# Patient Record
Sex: FEMALE | Race: WHITE | Employment: FULL TIME | URBAN - METROPOLITAN AREA
[De-identification: names, ages, dates, MRNs, and addresses within clinical notes are randomized per-mention and may not be internally consistent; named-entity substitution may affect disease eponyms.]

---

## 2017-01-07 ENCOUNTER — LAB ENCOUNTER (OUTPATIENT)
Dept: LAB | Age: 62
End: 2017-01-07
Attending: ORTHOPAEDIC SURGERY
Payer: COMMERCIAL

## 2017-01-07 DIAGNOSIS — Z01.818 PREOP TESTING: ICD-10-CM

## 2017-01-07 LAB
ANTIBODY SCREEN: NEGATIVE
RH BLOOD TYPE: POSITIVE

## 2017-01-07 PROCEDURE — 86900 BLOOD TYPING SEROLOGIC ABO: CPT

## 2017-01-07 PROCEDURE — 86901 BLOOD TYPING SEROLOGIC RH(D): CPT

## 2017-01-07 PROCEDURE — 86850 RBC ANTIBODY SCREEN: CPT

## 2017-01-07 PROCEDURE — 36415 COLL VENOUS BLD VENIPUNCTURE: CPT

## 2017-01-12 ENCOUNTER — ANESTHESIA (OUTPATIENT)
Dept: SURGERY | Facility: HOSPITAL | Age: 62
DRG: 462 | End: 2017-01-12
Payer: COMMERCIAL

## 2017-01-12 ENCOUNTER — SURGERY (OUTPATIENT)
Age: 62
End: 2017-01-12

## 2017-01-12 ENCOUNTER — ANESTHESIA EVENT (OUTPATIENT)
Dept: SURGERY | Facility: HOSPITAL | Age: 62
DRG: 462 | End: 2017-01-12
Payer: COMMERCIAL

## 2017-01-12 ENCOUNTER — APPOINTMENT (OUTPATIENT)
Dept: GENERAL RADIOLOGY | Facility: HOSPITAL | Age: 62
DRG: 462 | End: 2017-01-12
Attending: ORTHOPAEDIC SURGERY
Payer: COMMERCIAL

## 2017-01-12 PROCEDURE — 73560 X-RAY EXAM OF KNEE 1 OR 2: CPT

## 2017-01-12 RX ORDER — MIDAZOLAM HYDROCHLORIDE 1 MG/ML
INJECTION INTRAMUSCULAR; INTRAVENOUS AS NEEDED
Status: DISCONTINUED | OUTPATIENT
Start: 2017-01-12 | End: 2017-01-12 | Stop reason: SURG

## 2017-01-12 RX ORDER — BUPIVACAINE HYDROCHLORIDE 5 MG/ML
INJECTION, SOLUTION EPIDURAL; INTRACAUDAL AS NEEDED
Status: DISCONTINUED | OUTPATIENT
Start: 2017-01-12 | End: 2017-01-12 | Stop reason: SURG

## 2017-01-12 RX ORDER — DEXAMETHASONE SODIUM PHOSPHATE 4 MG/ML
VIAL (ML) INJECTION AS NEEDED
Status: DISCONTINUED | OUTPATIENT
Start: 2017-01-12 | End: 2017-01-12 | Stop reason: SURG

## 2017-01-12 RX ORDER — CEFAZOLIN SODIUM 1 G/3ML
INJECTION, POWDER, FOR SOLUTION INTRAMUSCULAR; INTRAVENOUS AS NEEDED
Status: DISCONTINUED | OUTPATIENT
Start: 2017-01-12 | End: 2017-01-12 | Stop reason: SURG

## 2017-01-12 RX ORDER — ONDANSETRON 2 MG/ML
INJECTION INTRAMUSCULAR; INTRAVENOUS AS NEEDED
Status: DISCONTINUED | OUTPATIENT
Start: 2017-01-12 | End: 2017-01-12 | Stop reason: SURG

## 2017-01-12 RX ORDER — LIDOCAINE HYDROCHLORIDE 10 MG/ML
INJECTION, SOLUTION EPIDURAL; INFILTRATION; INTRACAUDAL; PERINEURAL AS NEEDED
Status: DISCONTINUED | OUTPATIENT
Start: 2017-01-12 | End: 2017-01-12 | Stop reason: SURG

## 2017-01-12 RX ORDER — CLINDAMYCIN PHOSPHATE 150 MG/ML
INJECTION, SOLUTION INTRAVENOUS AS NEEDED
Status: DISCONTINUED | OUTPATIENT
Start: 2017-01-12 | End: 2017-01-12 | Stop reason: SURG

## 2017-01-12 RX ORDER — MORPHINE SULFATE 1 MG/ML
INJECTION, SOLUTION EPIDURAL; INTRATHECAL; INTRAVENOUS AS NEEDED
Status: DISCONTINUED | OUTPATIENT
Start: 2017-01-12 | End: 2017-01-12 | Stop reason: SURG

## 2017-01-12 RX ADMIN — CLINDAMYCIN PHOSPHATE 900 MG: 150 INJECTION, SOLUTION INTRAVENOUS at 16:18:00

## 2017-01-12 RX ADMIN — DEXAMETHASONE SODIUM PHOSPHATE 4 MG: 4 MG/ML VIAL (ML) INJECTION at 16:25:00

## 2017-01-12 RX ADMIN — MIDAZOLAM HYDROCHLORIDE 2.5 MG: 1 INJECTION INTRAMUSCULAR; INTRAVENOUS at 16:19:00

## 2017-01-12 RX ADMIN — SODIUM CHLORIDE, SODIUM LACTATE, POTASSIUM CHLORIDE, CALCIUM CHLORIDE: 600; 310; 30; 20 INJECTION, SOLUTION INTRAVENOUS at 16:03:00

## 2017-01-12 RX ADMIN — SODIUM CHLORIDE, SODIUM LACTATE, POTASSIUM CHLORIDE, CALCIUM CHLORIDE: 600; 310; 30; 20 INJECTION, SOLUTION INTRAVENOUS at 19:15:00

## 2017-01-12 RX ADMIN — LIDOCAINE HYDROCHLORIDE 20 MG: 10 INJECTION, SOLUTION EPIDURAL; INFILTRATION; INTRACAUDAL; PERINEURAL at 16:13:00

## 2017-01-12 RX ADMIN — BUPIVACAINE HYDROCHLORIDE 2.5 ML: 5 INJECTION, SOLUTION EPIDURAL; INTRACAUDAL at 16:15:00

## 2017-01-12 RX ADMIN — MIDAZOLAM HYDROCHLORIDE 2.5 MG: 1 INJECTION INTRAMUSCULAR; INTRAVENOUS at 16:07:00

## 2017-01-12 RX ADMIN — ONDANSETRON 4 MG: 2 INJECTION INTRAMUSCULAR; INTRAVENOUS at 16:25:00

## 2017-01-12 RX ADMIN — CEFAZOLIN SODIUM 1 G: 1 INJECTION, POWDER, FOR SOLUTION INTRAMUSCULAR; INTRAVENOUS at 17:46:00

## 2017-01-12 RX ADMIN — MIDAZOLAM HYDROCHLORIDE 2.5 MG: 1 INJECTION INTRAMUSCULAR; INTRAVENOUS at 17:32:00

## 2017-01-12 RX ADMIN — SODIUM CHLORIDE, SODIUM LACTATE, POTASSIUM CHLORIDE, CALCIUM CHLORIDE: 600; 310; 30; 20 INJECTION, SOLUTION INTRAVENOUS at 17:58:00

## 2017-01-12 RX ADMIN — MORPHINE SULFATE 0.3 MG: 1 INJECTION, SOLUTION EPIDURAL; INTRATHECAL; INTRAVENOUS at 16:15:00

## 2017-01-12 NOTE — ANESTHESIA PROCEDURE NOTES
Spinal Block  Performed by: SOLOMON MCCABE  Authorized by: SOLOMON MCCABE    Patient Location:  OR  Start Time:  1/12/2017 4:12 PM  End Time:  1/12/2017 4:15 PM  Site identification: surface landmarks    Reason for Block: surgical anesthesia    Anesthesi

## 2017-01-12 NOTE — ANESTHESIA PREPROCEDURE EVALUATION
Anesthesia PreOp Note    HPI:     Brandie Wetzel is a 65year old female who presents for preoperative consultation requested by: Viky Brown MD    Date of Surgery: 1/12/2017    Procedure(s):  KNEE TOTAL REPLACEMENT  Indication: Primary osteoarth Medications Ordered in Epic:  lactated ringers infusion  Intravenous Continuous Natalie Copeland MD Last Rate: 20 mL/hr at 01/12/17 1535   acetaminophen (TYLENOL) tab 650 mg 650 mg Oral Once Isaiah Copeland  mg at 01/12/17 1500   famoTIDine (P 145 12/17/2016   K 4.2 12/17/2016   * 12/17/2016   CO2 28 12/17/2016   BUN 13 12/17/2016   CREATSERUM 0.85 12/17/2016   GLU 98 12/17/2016          Vital Signs:   height is 1.6 m (5' 3\") and weight is 67.643 kg (149 lb 2 oz).  Her oral temperature is

## 2017-01-13 ENCOUNTER — APPOINTMENT (OUTPATIENT)
Dept: PHYSICAL THERAPY | Facility: HOSPITAL | Age: 62
DRG: 462 | End: 2017-01-13
Attending: PHYSICIAN ASSISTANT
Payer: COMMERCIAL

## 2017-01-13 NOTE — ANESTHESIA POSTPROCEDURE EVALUATION
Patient: Ravi Ng    Procedure Summary     Date Anesthesia Start Anesthesia Stop Room / Location    01/12/17 1605  [facility] OR 06 / [facility] MAIN OR       Procedure Diagnosis Surgeon Responsible Provider    KNEE TOTAL REPLACEMENT (Bilateral ) Primary os

## 2017-03-06 PROBLEM — Z96.653 S/P TOTAL KNEE REPLACEMENT, BILATERAL: Status: ACTIVE | Noted: 2017-03-06

## 2017-11-25 ENCOUNTER — HOSPITAL ENCOUNTER (OUTPATIENT)
Dept: MAMMOGRAPHY | Age: 62
Discharge: HOME | End: 2017-11-25
Attending: OBSTETRICS & GYNECOLOGY
Payer: COMMERCIAL

## 2017-11-25 DIAGNOSIS — Z12.31 ENCOUNTER FOR SCREENING MAMMOGRAM FOR MALIGNANT NEOPLASM OF BREAST: ICD-10-CM

## 2017-11-25 PROCEDURE — 77067 SCR MAMMO BI INCL CAD: CPT | Performed by: OBSTETRICS & GYNECOLOGY

## 2018-05-21 PROBLEM — F17.200 TOBACCO USE DISORDER: Status: ACTIVE | Noted: 2018-05-21

## 2018-06-04 PROBLEM — M65.4 DE QUERVAIN'S TENOSYNOVITIS, RIGHT: Status: ACTIVE | Noted: 2018-06-04

## 2018-07-27 PROCEDURE — 86762 RUBELLA ANTIBODY: CPT | Performed by: INTERNAL MEDICINE

## 2018-07-27 PROCEDURE — 86765 RUBEOLA ANTIBODY: CPT | Performed by: INTERNAL MEDICINE

## 2018-07-27 PROCEDURE — 86735 MUMPS ANTIBODY: CPT | Performed by: INTERNAL MEDICINE

## 2018-07-27 PROCEDURE — 36415 COLL VENOUS BLD VENIPUNCTURE: CPT | Performed by: INTERNAL MEDICINE

## 2018-12-10 ENCOUNTER — TELEPHONE (OUTPATIENT)
Dept: GASTROENTEROLOGY | Facility: CLINIC | Age: 63
End: 2018-12-10

## 2018-12-10 NOTE — TELEPHONE ENCOUNTER
----- Message from Trupti Dawson RN sent at 12/12/2016  7:53 AM CST -----  Regarding: Recall MRI  Recall MRI per GS for 2 years. Last procedure 12/2/16 (MRI/MRCP).

## 2018-12-11 NOTE — TELEPHONE ENCOUNTER
Please inform the patient that she is due for an MRI/MRCP of the pancreas. I have not, however, seen the patient since November 2015.   I would advise that she be seen in the office in follow-up first.  Alternatively she may proceed with follow-up MRI test

## 2018-12-12 NOTE — TELEPHONE ENCOUNTER
Future Appointments   Date Time Provider Virgen Medina   1/2/2019  4:15 PM Cynthia Moreau MD [facility] Deepthi LUCAS     Patient notified to come to [address] [facility] [address], [place], [place] (orange parking lot at [address] [facility], 3rd floor, arrive at 13

## 2019-01-02 ENCOUNTER — OFFICE VISIT (OUTPATIENT)
Dept: GASTROENTEROLOGY | Facility: CLINIC | Age: 64
End: 2019-01-02
Payer: COMMERCIAL

## 2019-01-02 VITALS
HEART RATE: 87 BPM | WEIGHT: 137 LBS | BODY MASS INDEX: 24.58 KG/M2 | HEIGHT: 62.5 IN | DIASTOLIC BLOOD PRESSURE: 75 MMHG | SYSTOLIC BLOOD PRESSURE: 127 MMHG

## 2019-01-02 DIAGNOSIS — D49.0 IPMN (INTRADUCTAL PAPILLARY MUCINOUS NEOPLASM): Primary | ICD-10-CM

## 2019-01-02 PROCEDURE — 99212 OFFICE O/P EST SF 10 MIN: CPT | Performed by: INTERNAL MEDICINE

## 2019-01-02 PROCEDURE — 99213 OFFICE O/P EST LOW 20 MIN: CPT | Performed by: INTERNAL MEDICINE

## 2019-01-03 ENCOUNTER — TELEPHONE (OUTPATIENT)
Dept: GASTROENTEROLOGY | Facility: CLINIC | Age: 64
End: 2019-01-03

## 2019-01-03 NOTE — TELEPHONE ENCOUNTER
Notified patient Dr. Andrey Moore has entered order for MRI/MRCP of pancreas and pt needs to call to schedule at [phone].     However, I notified patient to schedule at least 2 or more weeks from now so our managed care dept can work with patients insurance and

## 2019-01-03 NOTE — TELEPHONE ENCOUNTER
GI RN staff: I ordered an MRI/MRCP of the pancreas in light of a pancreatic cyst/IPMN. Please contact the patient once the study has been authorized so that she may schedule.

## 2019-01-03 NOTE — PROGRESS NOTES
HPI:    Patient ID: Janee Rabago is a 62year old female. HPI  The patient returns in follow-up. She was last seen in November 2015.     As per previous notes the patient has a history of an incidental well-demarcated non-septated cystic lesion of Mouth/Throat: No oropharyngeal exudate. Eyes: Conjunctivae are normal. No scleral icterus. Neck: Neck supple. No thyromegaly present. Cardiovascular: Normal rate, regular rhythm and normal heart sounds.    Pulmonary/Chest: Effort normal and breath s Albumin      3.5 - 4.8 g/dL 4.1   Total Bilirubin      0.10 - 2.00 mg/dL 0.24   ALKALINE PHOSPHATASE      50 - 130 U/L 114   AST (SGOT)      15 - 41 U/L 15   ALT (SGPT)      14 - 54 U/L 22   GFR CKD-EPI      >=60.00 mL/min/1.73 m² 74.73   HEMOGLOBIN A1C signal intensity. No focal mass or main pancreatic duct dilatation is seen. There is no evidence of pancreatitis. OTHER FINDINGS:          LUNG BASES:  Scattered reticular signal abnormalities are present and may reflect atelectasis or scarring.     FABIENNE stability for 5   years, a followup surveillance examination can be considered on clinical grounds. 2. Hepatomegaly or Riedel's lobe morphology. 3. Mild splenomegaly. 4. Lesser incidental findings as above.                  ASSESSMENT/PLAN:   Ip

## 2019-01-26 ENCOUNTER — HOSPITAL ENCOUNTER (OUTPATIENT)
Dept: MRI IMAGING | Facility: HOSPITAL | Age: 64
Discharge: HOME | End: 2019-01-26
Attending: INTERNAL MEDICINE
Payer: COMMERCIAL

## 2019-01-26 DIAGNOSIS — D49.0 IPMN (INTRADUCTAL PAPILLARY MUCINOUS NEOPLASM): ICD-10-CM

## 2019-01-26 PROCEDURE — 74183 MRI ABD W/O CNTR FLWD CNTR: CPT | Performed by: INTERNAL MEDICINE

## 2019-01-26 PROCEDURE — A9575 INJ GADOTERATE MEGLUMI 0.1ML: HCPCS | Performed by: INTERNAL MEDICINE

## 2019-01-31 ENCOUNTER — TELEPHONE (OUTPATIENT)
Dept: GASTROENTEROLOGY | Facility: CLINIC | Age: 64
End: 2019-01-31

## 2019-01-31 NOTE — TELEPHONE ENCOUNTER
Future Appointments   Date Time Provider Virgen Medina   3/11/2019  3:15 PM Kay Moreau MD [facility]     Appt with .

## 2019-01-31 NOTE — TELEPHONE ENCOUNTER
MRI/MRCP recall for 2 years entered for 1/2021.     LMTCB- patient to have f/u appt with Dr. Jana Kovacs around 2/28/19

## 2019-01-31 NOTE — TELEPHONE ENCOUNTER
----- Message from Bassam Rosenthal MD sent at 1/29/2019  6:28 PM CST -----  I spoke to the patient. The MRI reveals that the pancreatic cystic lesion is stable in size and appearance.   The patient has been experiencing intermittent nausea, \"pudding-

## 2019-03-11 ENCOUNTER — LAB ENCOUNTER (OUTPATIENT)
Dept: LAB | Facility: HOSPITAL | Age: 64
End: 2019-03-11
Attending: INTERNAL MEDICINE
Payer: COMMERCIAL

## 2019-03-11 ENCOUNTER — OFFICE VISIT (OUTPATIENT)
Dept: GASTROENTEROLOGY | Facility: CLINIC | Age: 64
End: 2019-03-11
Payer: COMMERCIAL

## 2019-03-11 VITALS
HEART RATE: 86 BPM | WEIGHT: 135 LBS | HEIGHT: 62.5 IN | SYSTOLIC BLOOD PRESSURE: 132 MMHG | DIASTOLIC BLOOD PRESSURE: 79 MMHG | BODY MASS INDEX: 24.22 KG/M2

## 2019-03-11 DIAGNOSIS — R19.4 CHANGE IN BOWEL HABITS: ICD-10-CM

## 2019-03-11 DIAGNOSIS — R19.4 CHANGE IN BOWEL HABITS: Primary | ICD-10-CM

## 2019-03-11 DIAGNOSIS — R63.4 WEIGHT LOSS: ICD-10-CM

## 2019-03-11 LAB
ALBUMIN SERPL-MCNC: 3.9 G/DL (ref 3.4–5)
ALBUMIN/GLOB SERPL: 1.1 {RATIO} (ref 1–2)
ALP LIVER SERPL-CCNC: 89 U/L (ref 50–130)
ALT SERPL-CCNC: 25 U/L (ref 13–56)
ANION GAP SERPL CALC-SCNC: 5 MMOL/L (ref 0–18)
AST SERPL-CCNC: 15 U/L (ref 15–37)
BASOPHILS # BLD AUTO: 0.07 X10(3) UL (ref 0–0.2)
BASOPHILS NFR BLD AUTO: 0.9 %
BILIRUB SERPL-MCNC: 0.4 MG/DL (ref 0.1–2)
BUN BLD-MCNC: 23 MG/DL (ref 7–18)
BUN/CREAT SERPL: 25 (ref 10–20)
CALCIUM BLD-MCNC: 9 MG/DL (ref 8.5–10.1)
CHLORIDE SERPL-SCNC: 108 MMOL/L (ref 98–107)
CO2 SERPL-SCNC: 27 MMOL/L (ref 21–32)
CREAT BLD-MCNC: 0.92 MG/DL (ref 0.55–1.02)
DEPRECATED RDW RBC AUTO: 47 FL (ref 35.1–46.3)
EOSINOPHIL # BLD AUTO: 0.16 X10(3) UL (ref 0–0.7)
EOSINOPHIL NFR BLD AUTO: 2.1 %
ERYTHROCYTE [DISTWIDTH] IN BLOOD BY AUTOMATED COUNT: 13.6 % (ref 11–15)
ERYTHROCYTE [SEDIMENTATION RATE] IN BLOOD: 7 MM/HR (ref 0–30)
GLOBULIN PLAS-MCNC: 3.4 G/DL (ref 2.8–4.4)
GLUCOSE BLD-MCNC: 91 MG/DL (ref 70–99)
HCT VFR BLD AUTO: 44.1 % (ref 35–48)
HGB BLD-MCNC: 14.2 G/DL (ref 12–16)
IGA SERPL-MCNC: 135 MG/DL (ref 70–312)
IMM GRANULOCYTES # BLD AUTO: 0.02 X10(3) UL (ref 0–1)
IMM GRANULOCYTES NFR BLD: 0.3 %
LYMPHOCYTES # BLD AUTO: 2.18 X10(3) UL (ref 1–4)
LYMPHOCYTES NFR BLD AUTO: 29.2 %
M PROTEIN MFR SERPL ELPH: 7.3 G/DL (ref 6.4–8.2)
MCH RBC QN AUTO: 30.5 PG (ref 26–34)
MCHC RBC AUTO-ENTMCNC: 32.2 G/DL (ref 31–37)
MCV RBC AUTO: 94.8 FL (ref 80–100)
MONOCYTES # BLD AUTO: 0.49 X10(3) UL (ref 0.1–1)
MONOCYTES NFR BLD AUTO: 6.6 %
NEUTROPHILS # BLD AUTO: 4.54 X10 (3) UL (ref 1.5–7.7)
NEUTROPHILS # BLD AUTO: 4.54 X10(3) UL (ref 1.5–7.7)
NEUTROPHILS NFR BLD AUTO: 60.9 %
OSMOLALITY SERPL CALC.SUM OF ELEC: 293 MOSM/KG (ref 275–295)
PLATELET # BLD AUTO: 236 10(3)UL (ref 150–450)
POTASSIUM SERPL-SCNC: 4.2 MMOL/L (ref 3.5–5.1)
RBC # BLD AUTO: 4.65 X10(6)UL (ref 3.8–5.3)
SODIUM SERPL-SCNC: 140 MMOL/L (ref 136–145)
TSI SER-ACNC: 1.21 MIU/ML (ref 0.36–3.74)
WBC # BLD AUTO: 7.5 X10(3) UL (ref 4–11)

## 2019-03-11 PROCEDURE — 99212 OFFICE O/P EST SF 10 MIN: CPT | Performed by: INTERNAL MEDICINE

## 2019-03-11 PROCEDURE — 83516 IMMUNOASSAY NONANTIBODY: CPT

## 2019-03-11 PROCEDURE — 80053 COMPREHEN METABOLIC PANEL: CPT

## 2019-03-11 PROCEDURE — 99213 OFFICE O/P EST LOW 20 MIN: CPT | Performed by: INTERNAL MEDICINE

## 2019-03-11 PROCEDURE — 36415 COLL VENOUS BLD VENIPUNCTURE: CPT

## 2019-03-11 PROCEDURE — 84443 ASSAY THYROID STIM HORMONE: CPT

## 2019-03-11 PROCEDURE — 85652 RBC SED RATE AUTOMATED: CPT

## 2019-03-11 PROCEDURE — 85025 COMPLETE CBC W/AUTO DIFF WBC: CPT

## 2019-03-11 PROCEDURE — 82784 ASSAY IGA/IGD/IGG/IGM EACH: CPT

## 2019-03-11 NOTE — PROGRESS NOTES
HPI:    Patient ID: Janee Rabago is a 62year old female. HPI  The patient returns in follow-up. She was last seen in January 2019.     As per previous notes the patient has a history of an incidental well-demarcated non-septated cystic lesion of th November 2020).       Review of Systems   See above      Wt Readings from Last 6 Encounters:  03/11/19 : 135 lb (61.2 kg)  01/02/19 : 137 lb (62.1 kg)  07/27/18 : 145 lb (65.8 kg)  06/07/18 : 149 lb (67.6 kg)  06/04/18 : 145 lb (65.8 kg)  05/17/18 : 149 lb however, in light of the symptoms and weight loss, I would recommend further evaluation. I am recommending laboratory testing including a CBC, chemistries, TSH, ESR and sprue serology.   The patient is a smoker and I have asked her to discuss lung cancer s

## 2019-03-11 NOTE — PATIENT INSTRUCTIONS
1.  Obtain blood work for celiac disease. 2.  Please speak to Dr. Zohaib Whatley about additional testing such as chest imaging.

## 2019-03-13 LAB — TTG IGA SER-ACNC: 0.2 U/ML (ref ?–7)

## 2020-09-17 ENCOUNTER — HOSPITAL ENCOUNTER (EMERGENCY)
Facility: HOSPITAL | Age: 65
Discharge: HOME | End: 2020-09-17
Attending: EMERGENCY MEDICINE
Payer: COMMERCIAL

## 2020-09-17 ENCOUNTER — APPOINTMENT (OUTPATIENT)
Dept: GENERAL RADIOLOGY | Facility: HOSPITAL | Age: 65
End: 2020-09-17
Attending: EMERGENCY MEDICINE
Payer: COMMERCIAL

## 2020-09-17 VITALS
DIASTOLIC BLOOD PRESSURE: 73 MMHG | OXYGEN SATURATION: 97 % | BODY MASS INDEX: 24.8 KG/M2 | HEART RATE: 80 BPM | TEMPERATURE: 99 F | RESPIRATION RATE: 21 BRPM | WEIGHT: 140 LBS | SYSTOLIC BLOOD PRESSURE: 108 MMHG | HEIGHT: 63 IN

## 2020-09-17 DIAGNOSIS — R07.89 CHEST PAIN, ATYPICAL: Primary | ICD-10-CM

## 2020-09-17 LAB
ALBUMIN SERPL-MCNC: 3.9 G/DL (ref 3.4–5)
ALP LIVER SERPL-CCNC: 98 U/L (ref 50–130)
ALT SERPL-CCNC: 23 U/L (ref 13–56)
ANION GAP SERPL CALC-SCNC: 5 MMOL/L (ref 0–18)
AST SERPL-CCNC: 13 U/L (ref 15–37)
BASOPHILS # BLD AUTO: 0.05 X10(3) UL (ref 0–0.2)
BASOPHILS NFR BLD AUTO: 0.7 %
BILIRUB DIRECT SERPL-MCNC: 0.2 MG/DL (ref 0–0.2)
BILIRUB SERPL-MCNC: 0.7 MG/DL (ref 0.1–2)
BUN BLD-MCNC: 11 MG/DL (ref 7–18)
BUN/CREAT SERPL: 13.6 (ref 10–20)
CALCIUM BLD-MCNC: 9.3 MG/DL (ref 8.5–10.1)
CHLORIDE SERPL-SCNC: 111 MMOL/L (ref 98–112)
CO2 SERPL-SCNC: 25 MMOL/L (ref 21–32)
CREAT BLD-MCNC: 0.81 MG/DL (ref 0.55–1.02)
D DIMER PPP FEU-MCNC: 0.33 UG/ML FEU (ref ?–0.64)
DEPRECATED RDW RBC AUTO: 43.8 FL (ref 35.1–46.3)
EOSINOPHIL # BLD AUTO: 0.02 X10(3) UL (ref 0–0.7)
EOSINOPHIL NFR BLD AUTO: 0.3 %
ERYTHROCYTE [DISTWIDTH] IN BLOOD BY AUTOMATED COUNT: 12.8 % (ref 11–15)
GLUCOSE BLD-MCNC: 122 MG/DL (ref 70–99)
HCT VFR BLD AUTO: 43.3 % (ref 35–48)
HGB BLD-MCNC: 14.3 G/DL (ref 12–16)
IMM GRANULOCYTES # BLD AUTO: 0.02 X10(3) UL (ref 0–1)
IMM GRANULOCYTES NFR BLD: 0.3 %
LIPASE SERPL-CCNC: 97 U/L (ref 73–393)
LYMPHOCYTES # BLD AUTO: 1.26 X10(3) UL (ref 1–4)
LYMPHOCYTES NFR BLD AUTO: 18.6 %
M PROTEIN MFR SERPL ELPH: 7.6 G/DL (ref 6.4–8.2)
MCH RBC QN AUTO: 30.8 PG (ref 26–34)
MCHC RBC AUTO-ENTMCNC: 33 G/DL (ref 31–37)
MCV RBC AUTO: 93.1 FL (ref 80–100)
MONOCYTES # BLD AUTO: 0.39 X10(3) UL (ref 0.1–1)
MONOCYTES NFR BLD AUTO: 5.8 %
NEUTROPHILS # BLD AUTO: 5.02 X10 (3) UL (ref 1.5–7.7)
NEUTROPHILS # BLD AUTO: 5.02 X10(3) UL (ref 1.5–7.7)
NEUTROPHILS NFR BLD AUTO: 74.3 %
OSMOLALITY SERPL CALC.SUM OF ELEC: 293 MOSM/KG (ref 275–295)
PLATELET # BLD AUTO: 212 10(3)UL (ref 150–450)
POTASSIUM SERPL-SCNC: 3.7 MMOL/L (ref 3.5–5.1)
RBC # BLD AUTO: 4.65 X10(6)UL (ref 3.8–5.3)
SODIUM SERPL-SCNC: 141 MMOL/L (ref 136–145)
TROPONIN I SERPL-MCNC: <0.045 NG/ML (ref ?–0.04)
TROPONIN I SERPL-MCNC: <0.045 NG/ML (ref ?–0.04)
WBC # BLD AUTO: 6.8 X10(3) UL (ref 4–11)

## 2020-09-17 PROCEDURE — 96374 THER/PROPH/DIAG INJ IV PUSH: CPT

## 2020-09-17 PROCEDURE — 93005 ELECTROCARDIOGRAM TRACING: CPT

## 2020-09-17 PROCEDURE — 93010 ELECTROCARDIOGRAM REPORT: CPT | Performed by: EMERGENCY MEDICINE

## 2020-09-17 PROCEDURE — 71045 X-RAY EXAM CHEST 1 VIEW: CPT | Performed by: EMERGENCY MEDICINE

## 2020-09-17 PROCEDURE — 85379 FIBRIN DEGRADATION QUANT: CPT | Performed by: EMERGENCY MEDICINE

## 2020-09-17 PROCEDURE — 80048 BASIC METABOLIC PNL TOTAL CA: CPT | Performed by: EMERGENCY MEDICINE

## 2020-09-17 PROCEDURE — 83690 ASSAY OF LIPASE: CPT | Performed by: EMERGENCY MEDICINE

## 2020-09-17 PROCEDURE — 84484 ASSAY OF TROPONIN QUANT: CPT | Performed by: EMERGENCY MEDICINE

## 2020-09-17 PROCEDURE — 80076 HEPATIC FUNCTION PANEL: CPT | Performed by: EMERGENCY MEDICINE

## 2020-09-17 PROCEDURE — 99284 EMERGENCY DEPT VISIT MOD MDM: CPT

## 2020-09-17 PROCEDURE — 85025 COMPLETE CBC W/AUTO DIFF WBC: CPT | Performed by: EMERGENCY MEDICINE

## 2020-09-17 RX ORDER — ONDANSETRON 2 MG/ML
4 INJECTION INTRAMUSCULAR; INTRAVENOUS ONCE
Status: COMPLETED | OUTPATIENT
Start: 2020-09-17 | End: 2020-09-17

## 2020-09-17 RX ORDER — ASPIRIN 81 MG/1
324 TABLET, CHEWABLE ORAL ONCE
Status: COMPLETED | OUTPATIENT
Start: 2020-09-17 | End: 2020-09-17

## 2020-09-17 RX ORDER — IPRATROPIUM BROMIDE AND ALBUTEROL SULFATE 2.5; .5 MG/3ML; MG/3ML
SOLUTION RESPIRATORY (INHALATION)
Status: DISCONTINUED
Start: 2020-09-17 | End: 2020-09-17

## 2020-09-17 NOTE — ED INITIAL ASSESSMENT (HPI)
Pt presents to ED for c/o intermittent episodes of chest \"tightness\" starting yesterday afternoon after receiving flu shot at school . Pt states she was driving home and experienced tightness which resolved after several minutes.  Pt states she experience

## 2020-09-17 NOTE — ED PROVIDER NOTES
Patient Seen in: [facility] AND [facility] Emergency Department      History   Patient presents with:  Chest Pain Angina    Stated Complaint: chest pain    HPI    44-year-old female with 2 episodes now 1 yesterday and 1 overnight of some mild nausea and discomfo (Temporal)   Resp (!) 27   Ht 160 cm (5' 3\")   Wt 63.5 kg   SpO2 95%   BMI 24.80 kg/m²         Physical Exam    Constitutional: Oriented to person, place, and time. Appears well-developed. No distress. Head: Normocephalic and atraumatic.    Eyes: Conjun intervals and axes as noted on EKG Report.   Rate: 100  Rhythm: Sinus Rhythm  Reading: No acute ischemic findings, nonspecific ST segment changes, no prior for comparison    Repeat to EKG at 1129 shows sinus rate of 83 with no acute changes and no significa

## 2020-10-19 NOTE — PROGRESS NOTES
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 • Cholecalciferol (VITAMIN D) 2000 UNITS Oral Tab Take  by mouth.          Allergies:    Amoxicillin-Na Dennis*    DIARRHEA    Comment:Blood in the stool    ROS:   CONSTITUTIONAL:  negative for fevers, chills  EYES:  negative for change in kidney stones, vaginal prolapse, who presents for colonoscopy recall evaluation        1. History of colon polyps: Patient is due for a 5-year colonoscopy recall due to a prior history of colon polyps on her procedure in 2015.   She is asymptomatic from an appropriate diagnostic information/codes. All questions were answered to the patient’s satisfaction. The patient signed informed consent and elected to proceed with colonoscopy with intervention [i.e. polypectomy, stent placement, etc.] as indicated.

## 2020-10-26 PROBLEM — I25.10 CORONARY ARTERY CALCIFICATION OF NATIVE ARTERY: Status: ACTIVE | Noted: 2020-10-26

## 2020-10-26 PROBLEM — I25.84 CORONARY ARTERY CALCIFICATION OF NATIVE ARTERY: Status: ACTIVE | Noted: 2020-10-26

## 2020-10-26 PROBLEM — E78.00 HYPERCHOLESTEROLEMIA: Status: ACTIVE | Noted: 2020-10-26

## 2020-11-02 ENCOUNTER — TELEPHONE (OUTPATIENT)
Dept: GASTROENTEROLOGY | Facility: CLINIC | Age: 65
End: 2020-11-02

## 2020-11-02 ENCOUNTER — OFFICE VISIT (OUTPATIENT)
Dept: GASTROENTEROLOGY | Facility: CLINIC | Age: 65
End: 2020-11-02
Payer: COMMERCIAL

## 2020-11-02 VITALS
TEMPERATURE: 98 F | SYSTOLIC BLOOD PRESSURE: 120 MMHG | WEIGHT: 144 LBS | HEIGHT: 62 IN | BODY MASS INDEX: 26.5 KG/M2 | HEART RATE: 96 BPM | DIASTOLIC BLOOD PRESSURE: 77 MMHG

## 2020-11-02 DIAGNOSIS — K86.2 PANCREATIC CYST: ICD-10-CM

## 2020-11-02 DIAGNOSIS — Z86.010 HISTORY OF COLON POLYPS: Primary | ICD-10-CM

## 2020-11-02 DIAGNOSIS — Z86.010 HX OF COLONIC POLYPS: Primary | ICD-10-CM

## 2020-11-02 PROCEDURE — 3008F BODY MASS INDEX DOCD: CPT | Performed by: NURSE PRACTITIONER

## 2020-11-02 PROCEDURE — 3074F SYST BP LT 130 MM HG: CPT | Performed by: NURSE PRACTITIONER

## 2020-11-02 PROCEDURE — 3078F DIAST BP <80 MM HG: CPT | Performed by: NURSE PRACTITIONER

## 2020-11-02 PROCEDURE — 99214 OFFICE O/P EST MOD 30 MIN: CPT | Performed by: NURSE PRACTITIONER

## 2020-11-02 RX ORDER — POLYETHYLENE GLYCOL 3350, SODIUM CHLORIDE, SODIUM BICARBONATE, POTASSIUM CHLORIDE 420; 11.2; 5.72; 1.48 G/4L; G/4L; G/4L; G/4L
POWDER, FOR SOLUTION ORAL
Qty: 1 BOTTLE | Refills: 0 | Status: ON HOLD | OUTPATIENT
Start: 2020-11-02 | End: 2021-05-04

## 2020-11-02 NOTE — PATIENT INSTRUCTIONS
-Schedule colonoscopy w/ Dr. Yaquelin Oro with IV twilight or MAC  Dx: hx colon polyps  -Eligible for NE: Yes  -Prep: Split dose Colyte/TriLyte or equivalent  -Anti-platelets and anti-coagulants: ASA-continue as prescribed  -Diabetes meds: None    ** If MA

## 2020-11-02 NOTE — TELEPHONE ENCOUNTER
Scheduled for: Colonoscopy 75441   Provider Name: Dr Betancourt Ear   Date:  Mon 2/15/2021   Location: [facility]   Sedation: IV   Time: 10:45 am  Prep: split dose colyte   Meds/Allergies Reconciled?:  Reviewed by provider  Diagnosis with codes: HCP Z86.010   Was pa

## 2020-12-16 ENCOUNTER — HOSPITAL ENCOUNTER (OUTPATIENT)
Dept: MRI IMAGING | Facility: HOSPITAL | Age: 65
Discharge: HOME | End: 2020-12-16
Attending: NURSE PRACTITIONER
Payer: COMMERCIAL

## 2020-12-16 DIAGNOSIS — K86.2 PANCREATIC CYST: ICD-10-CM

## 2020-12-16 PROCEDURE — 74181 MRI ABDOMEN W/O CONTRAST: CPT | Performed by: NURSE PRACTITIONER

## 2020-12-16 PROCEDURE — 76376 3D RENDER W/INTRP POSTPROCES: CPT | Performed by: NURSE PRACTITIONER

## 2020-12-17 ENCOUNTER — TELEPHONE (OUTPATIENT)
Dept: GASTROENTEROLOGY | Facility: CLINIC | Age: 65
End: 2020-12-17

## 2020-12-17 NOTE — TELEPHONE ENCOUNTER
Debora Faye, GELY  [facility] Clinical Staff             Reviewed MRCP with patient via [employer].  Redemonstrates stable pancreatic cyst.  Repeat in 2 yrs.        Nursing: Please place a 2-year recall for a repeat MRI abdomen/MRCP related to pancreatic lesi

## 2021-02-02 NOTE — TELEPHONE ENCOUNTER
I called the patient, who states her insurance has changed, effected 01/0/12021. Transferred to the  to update insurance information in [facility]

## 2021-02-03 ENCOUNTER — TELEPHONE (OUTPATIENT)
Dept: GASTROENTEROLOGY | Facility: CLINIC | Age: 66
End: 2021-02-03

## 2021-02-03 DIAGNOSIS — Z86.010 HISTORY OF COLON POLYPS: Primary | ICD-10-CM

## 2021-02-03 NOTE — TELEPHONE ENCOUNTER
Pt calling to reschedule CLN 2/15/21 due to insurance. Pt states she only has medicare part A and not part B.  Pt states she is in the process of applying for medicare part B.

## 2021-02-03 NOTE — TELEPHONE ENCOUNTER
Rescheduled for:  Colonoscopy 43958  Provider Name:  Dr. Jimmy Burris  Date:    From-2/15/21  To-5/4/21  Location:    [facility]  Sedation:   From-IV  To-MAC  Time:    From-1045  To-1015  Prep:  Colyte, sent new instructions   Meds/Allergies Reconciled?:  Physici

## 2021-03-04 DIAGNOSIS — Z23 NEED FOR VACCINATION: ICD-10-CM

## 2021-03-10 ENCOUNTER — IMMUNIZATION (OUTPATIENT)
Dept: LAB | Facility: HOSPITAL | Age: 66
End: 2021-03-10
Attending: HOSPITALIST
Payer: OTHER GOVERNMENT

## 2021-03-10 DIAGNOSIS — Z23 NEED FOR VACCINATION: Primary | ICD-10-CM

## 2021-03-10 PROCEDURE — 0011A SARSCOV2 VAC 100MCG/0.5ML IM: CPT

## 2021-04-07 ENCOUNTER — IMMUNIZATION (OUTPATIENT)
Dept: LAB | Facility: HOSPITAL | Age: 66
End: 2021-04-07
Attending: EMERGENCY MEDICINE
Payer: MEDICARE

## 2021-04-07 DIAGNOSIS — Z23 NEED FOR VACCINATION: Primary | ICD-10-CM

## 2021-04-07 PROCEDURE — 0012A SARSCOV2 VAC 100MCG/0.5ML IM: CPT

## 2021-04-19 NOTE — TELEPHONE ENCOUNTER
Phone room:    Can you please reach out to the patient to obtain updated insurance information. Thank you!

## 2021-04-28 RX ORDER — MULTIVIT WITH MINERALS/LUTEIN
1000 TABLET ORAL DAILY
COMMUNITY

## 2021-05-01 ENCOUNTER — LAB ENCOUNTER (OUTPATIENT)
Dept: LAB | Age: 66
End: 2021-05-01
Attending: INTERNAL MEDICINE
Payer: MEDICARE

## 2021-05-01 DIAGNOSIS — Z01.818 PRE-OP TESTING: ICD-10-CM

## 2021-05-04 ENCOUNTER — HOSPITAL ENCOUNTER (OUTPATIENT)
Facility: HOSPITAL | Age: 66
Setting detail: HOSPITAL OUTPATIENT SURGERY
Discharge: HOME | End: 2021-05-04
Attending: INTERNAL MEDICINE | Admitting: INTERNAL MEDICINE
Payer: MEDICARE

## 2021-05-04 ENCOUNTER — ANESTHESIA EVENT (OUTPATIENT)
Dept: ENDOSCOPY | Facility: HOSPITAL | Age: 66
End: 2021-05-04
Payer: MEDICARE

## 2021-05-04 ENCOUNTER — ANESTHESIA (OUTPATIENT)
Dept: ENDOSCOPY | Facility: HOSPITAL | Age: 66
End: 2021-05-04
Payer: MEDICARE

## 2021-05-04 VITALS
TEMPERATURE: 97 F | OXYGEN SATURATION: 95 % | WEIGHT: 140 LBS | HEART RATE: 86 BPM | HEIGHT: 63 IN | RESPIRATION RATE: 18 BRPM | SYSTOLIC BLOOD PRESSURE: 122 MMHG | BODY MASS INDEX: 24.8 KG/M2 | DIASTOLIC BLOOD PRESSURE: 74 MMHG

## 2021-05-04 DIAGNOSIS — Z01.818 PRE-OP TESTING: Primary | ICD-10-CM

## 2021-05-04 DIAGNOSIS — Z86.010 HX OF COLONIC POLYPS: ICD-10-CM

## 2021-05-04 PROCEDURE — 45385 COLONOSCOPY W/LESION REMOVAL: CPT | Performed by: INTERNAL MEDICINE

## 2021-05-04 PROCEDURE — 0DBK8ZX EXCISION OF ASCENDING COLON, VIA NATURAL OR ARTIFICIAL OPENING ENDOSCOPIC, DIAGNOSTIC: ICD-10-PCS | Performed by: INTERNAL MEDICINE

## 2021-05-04 RX ORDER — SODIUM CHLORIDE, SODIUM LACTATE, POTASSIUM CHLORIDE, CALCIUM CHLORIDE 600; 310; 30; 20 MG/100ML; MG/100ML; MG/100ML; MG/100ML
INJECTION, SOLUTION INTRAVENOUS CONTINUOUS
Status: DISCONTINUED | OUTPATIENT
Start: 2021-05-04 | End: 2021-05-04

## 2021-05-04 RX ORDER — NALOXONE HYDROCHLORIDE 0.4 MG/ML
80 INJECTION, SOLUTION INTRAMUSCULAR; INTRAVENOUS; SUBCUTANEOUS AS NEEDED
Status: DISCONTINUED | OUTPATIENT
Start: 2021-05-04 | End: 2021-05-04

## 2021-05-04 RX ORDER — LIDOCAINE HYDROCHLORIDE 10 MG/ML
INJECTION, SOLUTION EPIDURAL; INFILTRATION; INTRACAUDAL; PERINEURAL AS NEEDED
Status: DISCONTINUED | OUTPATIENT
Start: 2021-05-04 | End: 2021-05-04 | Stop reason: SURG

## 2021-05-04 RX ADMIN — LIDOCAINE HYDROCHLORIDE 50 MG: 10 INJECTION, SOLUTION EPIDURAL; INFILTRATION; INTRACAUDAL; PERINEURAL at 10:56:00

## 2021-05-04 RX ADMIN — SODIUM CHLORIDE, SODIUM LACTATE, POTASSIUM CHLORIDE, CALCIUM CHLORIDE: 600; 310; 30; 20 INJECTION, SOLUTION INTRAVENOUS at 11:23:00

## 2021-05-04 NOTE — OPERATIVE REPORT
[facility] Endoscopy Report      Date of Procedure:  05/04/21      Preoperative Diagnosis:  1. Colorectal cancer screening  2. Family history of colon cancer  3.   Personal history of adenomatous colon polyps      Postoperative Diagnosis: abnormalities. The procedure was well tolerated without immediate complication. Impression:  1. Colon polyp  2. Sigmoid colon diverticulosis, currently uncomplicated  3. Otherwise normal colonoscopy to the terminal ileum    Recommendations:  1.   H

## 2021-05-04 NOTE — ANESTHESIA PREPROCEDURE EVALUATION
Anesthesia PreOp Note    HPI:     Mariola Whelan is a 73year old female who presents for preoperative consultation requested by: Bianca Corey MD    Date of Surgery: 5/4/2021    Procedure(s):  COLONOSCOPY  Indication: Hx of colonic polyps    Rel Cholecalciferol (VITAMIN D) 2000 UNITS Oral Tab, Take  by mouth., Disp: , Rfl:   PEG 3350-KCl-Na Bicarb-NaCl (TRILYTE) 420 g Oral Recon Soln, Take prep as directed by gastro office.  May substitute with Trilyte/generic equivalent if needed, Disp: 1 Bottle, Seat Belt: Not Asked        Self-Exams: Not Asked    Social History Narrative      Not on file    Social Determinants of Health  Financial Resource Strain:       Difficulty of Paying Living Expenses:   Food Insecurity:       Worried About Running Out of Fo normal exam             Anesthesia Plan:   ASA:  2  Plan:   MAC  Informed Consent Plan and Risks Discussed With:  Patient  Discussed plan with:  CRNA and surgeon      I have informed Thao Seamanry and/or legal guardian or family member of the nature of

## 2021-05-04 NOTE — H&P
History & Physical Examination    Patient Name: Cammie Brito  MRN: [id number]  CSN: [id number]  YOB: 1955    Diagnosis: Colorectal cancer screening, family history of colon cancer, personal history of adenomatous polyps      Ascorbic Acid not sure onset     Social History    Tobacco Use      Smoking status: Current Every Day Smoker        Packs/day: 0.50        Types: Cigarettes      Smokeless tobacco: Never Used      Tobacco comment: Pt reports having \"tapered down\" recently    A

## 2021-05-06 ENCOUNTER — TELEPHONE (OUTPATIENT)
Dept: GASTROENTEROLOGY | Facility: CLINIC | Age: 66
End: 2021-05-06

## 2021-05-06 NOTE — TELEPHONE ENCOUNTER
Health Maintenance Updated. 5 year colonoscopy recall entered into patient outreach in [address] [place] [address] Rd. Next colonoscopy will be due 5/4/2026.

## 2021-05-06 NOTE — TELEPHONE ENCOUNTER
----- Message from Ruby Pool MD sent at 5/6/2021  5:32 PM CDT -----  As per LORENZO parameters left a message on the patient's voicemail. The ascending colon polyp was not adenomatous. I have recommended a surveillance colonoscopy in 5 years.   I h

## 2021-11-08 PROBLEM — M51.369 DEGENERATIVE DISC DISEASE, LUMBAR: Status: ACTIVE | Noted: 2021-11-08

## 2021-11-08 PROBLEM — M65.4 DE QUERVAIN'S TENOSYNOVITIS, RIGHT: Status: RESOLVED | Noted: 2018-06-04 | Resolved: 2021-11-08

## 2021-11-08 PROBLEM — M51.36 DEGENERATIVE DISC DISEASE, LUMBAR: Status: ACTIVE | Noted: 2021-11-08

## 2021-11-10 PROBLEM — Z87.442 HISTORY OF KIDNEY STONES: Status: ACTIVE | Noted: 2021-11-10

## 2021-11-10 PROBLEM — Z78.0 POSTMENOPAUSAL: Status: ACTIVE | Noted: 2021-11-10

## 2022-11-25 ENCOUNTER — TELEPHONE (OUTPATIENT)
Facility: CLINIC | Age: 67
End: 2022-11-25

## 2022-11-25 DIAGNOSIS — D49.0 IPMN (INTRADUCTAL PAPILLARY MUCINOUS NEOPLASM): Primary | ICD-10-CM

## 2022-11-25 NOTE — TELEPHONE ENCOUNTER
Patient outreach message received:    2 year MRI/MRCP recall entered in patient outreach per Jeramie Pearson.  Due on 12/17/2022

## 2022-11-25 NOTE — TELEPHONE ENCOUNTER
Dr. Baljinder Martin recommended recall MRI/MRCP in 2 years related to pancreatic lesion. Please place order if plan still appropriate and RN can notify the patient.     Thank you

## 2022-11-28 NOTE — TELEPHONE ENCOUNTER
I have entered the order for the MRI/MRCP. The patient should also be seen in the office in follow-up at her convenience.

## 2022-11-29 NOTE — TELEPHONE ENCOUNTER
Patient contacted. Aware she is due for MRI/MRCP  All questions answered. She wrote down the number to central scheduling and will call to schedule when she gets home from work.

## 2022-12-28 ENCOUNTER — HOSPITAL ENCOUNTER (OUTPATIENT)
Dept: MRI IMAGING | Facility: HOSPITAL | Age: 67
Discharge: HOME | End: 2022-12-28
Attending: INTERNAL MEDICINE
Payer: MEDICARE

## 2022-12-28 DIAGNOSIS — D49.0 IPMN (INTRADUCTAL PAPILLARY MUCINOUS NEOPLASM): ICD-10-CM

## 2022-12-28 PROCEDURE — A9575 INJ GADOTERATE MEGLUMI 0.1ML: HCPCS | Performed by: INTERNAL MEDICINE

## 2022-12-28 PROCEDURE — 74183 MRI ABD W/O CNTR FLWD CNTR: CPT | Performed by: INTERNAL MEDICINE

## 2022-12-28 RX ORDER — GADOTERATE MEGLUMINE 376.9 MG/ML
15 INJECTION INTRAVENOUS
Status: COMPLETED | OUTPATIENT
Start: 2022-12-28 | End: 2022-12-28

## 2022-12-28 RX ADMIN — GADOTERATE MEGLUMINE 14 ML: 376.9 INJECTION INTRAVENOUS at 12:04:00

## 2023-01-02 ENCOUNTER — TELEPHONE (OUTPATIENT)
Facility: CLINIC | Age: 68
End: 2023-01-02

## 2023-01-02 NOTE — TELEPHONE ENCOUNTER
----- Message from Shaye Garcia MD sent at 12/29/2022  7:20 PM CST -----  Hi Syeda,    The pancreas cyst on your recent MRI is stable in size without any worrisome features. Great news. A small liver cyst has very slightly increased in size, however, this is completely benign and is of no consequence. I would recommend that the MRI be repeated in 2 years. I would also recommend a follow-up office visit before your next MRI or sooner if you are experiencing any digestive issues. If I can answer any questions regarding the above, please do not hesitate to contact me. Sincerely,    Dr. Nivia Albrecht RNs: Please enter MRI/MRCP recall for 2 years.

## 2023-01-02 NOTE — TELEPHONE ENCOUNTER
Recall entered into pt outreach to complete MRI in 2 years per Dr Tavo Pena to f/u on liver and pancreatic cyst

## 2024-05-30 NOTE — ANESTHESIA POSTPROCEDURE EVALUATION
Patient: Alan Rajan    Procedure Summary     Date: 05/04/21 Room / Location: [facility] ENDOSCOPY 04 / [facility] ENDOSCOPY    Anesthesia Start: 4479 Anesthesia Stop: 0685    Procedure: COLONOSCOPY (N/A ) Diagnosis:       Hx of colonic polyps      (diverticulosis,
No

## 2024-12-13 ENCOUNTER — TELEPHONE (OUTPATIENT)
Facility: CLINIC | Age: 69
End: 2024-12-13

## 2024-12-13 DIAGNOSIS — D49.0 INTRADUCTAL PAPILLARY MUCINOUS NEOPLASM: Primary | ICD-10-CM

## 2024-12-13 NOTE — TELEPHONE ENCOUNTER
Dr Arrington    The patient is due for repeat MRI/MRCP. Previous MRCP was on 12/282022.    Order pended, please review and sign if ok.    Thank you        Murphy Moreau MD  12/29/2022  7:20 PM CST       Arsalan Landa,     The pancreas cyst on your recent MRI is stable in size without any worrisome features.  Great news.     A small liver cyst has very slightly increased in size, however, this is completely benign and is of no consequence.     I would recommend that the MRI be repeated in 2 years.     I would also recommend a follow-up office visit before your next MRI or sooner if you are experiencing any digestive issues.     If I can answer any questions regarding the above, please do not hesitate to contact me.     Sincerely,     Dr. Moreau     GI RNs: Please enter MRI/MRCP recall for 2 years.

## 2024-12-13 NOTE — TELEPHONE ENCOUNTER
GI RNs: I have tentatively entered the order for the MRI/MRCP.  I have not seen the patient since 2021.  If she is continue to follow with us and would like me to order the MRI/MRCP she should make a follow-up office appointment.

## 2024-12-13 NOTE — TELEPHONE ENCOUNTER
Patient outreach message received:    Recall entered into pt outreach to complete MRI in 2 years per Dr Moreau to f/u on liver and pancreatic cyst

## 2024-12-16 NOTE — TELEPHONE ENCOUNTER
Called and spoke to the patient, date of birth and name verified.    MD message relayed.    She agreed to schedule for the MRCP.    The patient verbalized understanding of information given.      Your Appointments      Monday January 20, 2025 11:20 AM  Follow Up Visit with Murphy Moreau MD  [facility] [address] [address]  [phone]

## 2025-01-19 NOTE — PROGRESS NOTES
Subjective:   Patient ID: Syeda Echeverria is a 70 year old female.    HPI  Syeda returns in follow-up.  She was last seen at colonoscopy in May 2021.     As per previous notes the patient has a history of an incidental well-demarcated non-septated cystic lesion of the pancreas discovered on urologic imaging in December 2011.  An EUS was performed at [facility] in 2013 which I believe was consistent with a branch duct IPMN.  Serial MRI imaging has been performed, the last in December 2022.  The MRI revealed stability of a 1.2 cm cystic lesion in the pancreatic body without worrisome MR findings.  A repeat MRCP/MRI in 2 years was advised and is scheduled for later today.  Smoking cessation was also advised.    Syeda also has a family history of colon cancer in her mother over the age of 60 and a personal history of adenomatous colon polyps.  Her last colonoscopy in 2021 revealed nonadenomatous polyps.  A 5-year surveillance/screening colonoscopy was advised (May 2026).  The patient also underwent a previous upper endoscopy in November 2015 which revealed \"grade 1 distal esophagitis\" without signs of Tripathi's esophagus.      Current history:  Upon routine laboratory testing, Syeda was found to have a persistent glycohemoglobin elevation of 6.1.  She was placed on metformin which was gradually escalated to # 3 tablets daily.  In conjunction with the metformin she lost #30 pounds of weight in 1 year.  She believes her weight is stabilized.  She establish care with a new primary care physician who decreased the metformin to #1 tablet daily.  The patient does not monitor her blood sugar at home.    Syeda otherwise feels well.  Her appetite is good.  She denies nausea, vomiting, dysphagia or dyne aphasia.  She has heartburn \"once in a while\".  She denies abdominal or back pain.  Bowel movements are \"healthy\" without change.  She has noted no bleeding.    Unfortunately Syeda continues to smoke less than 1/2 pack of cigarettes  daily.  She has started bupropion to achieve smoking cessation.  She denies cough or shortness of breath.  Syeda underwent a screening lung CT in November 2024 which was negative for lung lesions.    Other than the weight loss the patient's subjective wellbeing is good.      History/Other:   Review of Systems  See above    Wt Readings from Last 7 Encounters:   01/20/25 120 lb (54.4 kg)   03/23/22 151 lb (68.5 kg)   11/08/21 151 lb (68.5 kg)   10/29/21 151 lb (68.5 kg)   07/21/21 150 lb (68 kg)   06/08/21 146 lb (66.2 kg)   05/04/21 140 lb (63.5 kg)         Current Outpatient Medications   Medication Sig Dispense Refill    buPROPion  MG Oral Tablet 12 Hr Take 1 tablet (150 mg total) by mouth 2 (two) times daily.      metFORMIN 500 MG Oral Tab Take 1 tablet (500 mg total) by mouth daily with breakfast. Taking 1 tablet      ROSUVASTATIN 10 MG Oral Tab Take 1 tablet by mouth nightly 90 tablet 2    Ascorbic Acid (VITAMIN C) 1000 MG Oral Tab Take 1 tablet (1,000 mg total) by mouth daily.      famotidine 10 MG Oral Tab Take 1 tablet (10 mg total) by mouth as needed for Heartburn.      aspirin 81 MG Oral Tab Take 1 tablet (81 mg total) by mouth daily.      Cholecalciferol (VITAMIN D) 2000 UNITS Oral Tab Take  by mouth.      Meclizine HCl 25 MG Oral Tab Take 1 tablet (25 mg total) by mouth 3 (three) times daily as needed for Dizziness. (Patient not taking: Reported on 1/20/2025) 30 tablet 0     Allergies:Allergies[1]    Objective:   Physical Exam  Vitals and nursing note reviewed.   Constitutional:       General: She is not in acute distress.     Appearance: She is well-developed. She is not ill-appearing, toxic-appearing or diaphoretic.   HENT:      Head: Normocephalic and atraumatic.      Mouth/Throat:      Pharynx: No oropharyngeal exudate.   Eyes:      General: No scleral icterus.     Conjunctiva/sclera: Conjunctivae normal.   Neck:      Thyroid: No thyromegaly.   Cardiovascular:      Rate and Rhythm: Normal rate  and regular rhythm.      Heart sounds: Normal heart sounds.   Pulmonary:      Effort: Pulmonary effort is normal. No respiratory distress.      Breath sounds: Normal breath sounds. No wheezing or rales.   Abdominal:      General: Bowel sounds are normal. There is no distension.      Palpations: Abdomen is soft. There is no mass.      Tenderness: There is no abdominal tenderness. There is no guarding or rebound.   Musculoskeletal:      Cervical back: Neck supple.   Lymphadenopathy:      Cervical: No cervical adenopathy.   Neurological:      Mental Status: She is alert and oriented to person, place, and time.   Psychiatric:         Behavior: Behavior normal.           Component  Ref Range & Units 11/12/22  8:52 AM   Patient Fasting? Yes   Glucose  74 - 109 mg/dL 110 High    Blood Urea Nitrogen  6.0 - 20.0 mg/dL 16.0   Creatinine  0.50 - 0.90 mg/dL 0.77   BUN/CREAT Ratio  10.0 - 20.0 21.0 High    Sodium  136 - 145 mmol/L 144   Potassium  3.50 - 5.10 mmol/L 4.16   Chloride  98 - 107 mmol/L 107   Carbon Dioxide  22.0 - 29.0 mmol/L 25.4   Calcium  8.6 - 10.3 mg/dL 8.9   Total Protein  6.4 - 8.3 g/dL 7.1   Albumin  3.5 - 5.2 g/dL 4.2   Bilirubin, Total  0.00 - 1.20 mg/dL 0.45   Alkaline Phosphatase  55 - 142 U/L 101   AST  0 - 32 U/L 16   ALT  0 - 33 U/L 17   GFR CKD-EPI  >=60.00 mL/min/1.73 m² 80.72   Comment: Estimated GFR units: mL/min/1.73 square meters  eGFR calculated by the CKD-EPI equation.   Resulting Agency [facility] [place] [facility]     Specimen Collected: 11/12/22  8:52 AM Last Resulted: 11/12/22  5:03 PM   Received From: [facility]  Result Received: 12/28/22 11:18 AM       Component  Ref Range & Units 11/12/22  8:52 AM   HbA1c  4.0 - 5.6 % 6.1 High    Comment: Result is consistent with increased risk for diabetes and/or prediabetes.   Estimated Average Glucose  mg/dL 128   Comment: eAG is the estimated average glucose calculated from Hgb A1c according to the formula recommended by the American  Diabetes Association.eAG levels reflect the long term average glucose and may not correlate with random or fasting glucose levels since these represent specific points in time.   Resulting Agency [facility]     Specimen Collected: 11/12/22  8:52 AM Last Resulted: 11/12/22 11:03 AM   Received From: [facility]  Result Received: 12/28/22 11:18 AM     IMPRESSION:  1. Bilateral pulmonary nodules up to 8 mm, grossly unchanged since 11/9/2023. No new suspicious or  enlarging nodules. Suggest continued annual screening with LDCT in 12 months (Lung-RADS Category 2).  2. Extensive calcified atherosclerosis of the coronary arteries and mild calcified atherosclerosis  of the thoracic aorta.  3. Mild nodular heterogeneity of the thyroid gland, versus artifact. Consider ultrasound for further  evaluation if clinically indicated.    LUNG RADS CATEGORIZATION    Primary Category 2- Benign Appearance or Behavior; nodules with a very low likelihood of becoming a  clinically active cancer due to size or lack of growth. Management: Continue annual screening with  LDCT in 12 months.    Primary Category S- Significant- Other    Recommended follow up exam [id number] CT LD SCREENING FOR LUNG CANCER (CPT=)  Narrative    DATE OF SERVICE: 11.09.2024  LOW DOSE CHEST CT FOR LUNG CANCER SCREENING    CLINICAL INDICATION: Cigarette smoker.    COMPARISON: CT chest from 11/9/2023.    TECHNIQUE: Low-dose CT of the chest was performed at 1.25 mm slice thickness. No intravenous  contrast was administered. Additional 3D series, including MPR and MIP, were created and submitted  submitted for review. Additional postprocessing was performed on the separate workstation by the CT  technologist.    Automated exposure control and ALARA manual techniques for patient specific dose reduction were  followed while maintaining the necessary diagnostic image quality.    FINDINGS: The study is limited by its noncontrast screening  technique.  HEART/AORTA: Extensive calcified atherosclerosis of the coronary arteries. Mild calcified  atherosclerosis of the thoracic aorta and branch vessels.  MEDIASTINUM/KEN: Grossly unremarkable. Evaluation of the hilar structures is limited without IV  contrast.  PULMONARY PARENCHYMA: Tracheal secretions. Biapical pleural-parenchymal thickening. Bilateral  pulmonary nodules measuring up to 8mm. Scattered endobronchial mucus and blebs. No new suspicious or  enlarging pulmonary nodule.  OSSEOUS STRUCTURES: Mild to moderate multilevel thoracic spondylosis. Subcutaneous nodule in the  lower right back, 19 x 10 cm, likely sebaceous cyst.  CHEST WALL: Mild nodular heterogeneity of the thyroid gland, mostly at the isthmus and right lobe,  versus artifact. Ultrasound could be performed for further assessment, if necessary.  ABDOMEN: Small calcified granuloma in the liver. Small hepatic cyst, unchanged. Left adrenal nodular  fullness.  Exam End: 11/09/24  9:55 AM    Specimen Collected: 11/11/24  9:17 AM Last Resulted: 11/11/24  9:56 AM   Received From: [facility]  Result Received: 01/20/25 10:53 AM         PROCEDURE: MRI MRCP (W+WO) (CPT=74183)  MRCP       COMPARISON: [facility], MRI ABDOMEN WWO CONTRAST, 12/05/2011, 5:11 PM.  [facility], MRI MRCP (W+WO) (CPT=74183), 1/26/2019, 9:01 AM.  [facility], MRI ABDOMEN+MRCP (ALL W+WO)  (CPT=74183), 12/02/2016, 2:09 PM.  [facility], MRI ABDOMEN&MRCP W/3D (CPT=74181/81605), 12/16/2020, 7:47 AM.     INDICATIONS: D49.0 IPMN (intraductal papillary mucinous neoplasm).     TECHNIQUE: A comprehensive examination was performed utilizing a variety of imaging planes and imaging parameters to optimize visualization of suspected pathology.  Images were obtained before and after intravenous gadolinium infusion.     Magnetic resonance cholangiopancreatography was also performed for  delineation and assessment of the pancreaticobiliary tree. Volumetric 3-D reconstructions were created on an independent workstation.     MRCP FINDINGS:    GALLBLADDER:   No gallstones or wall thickening.    BILE DUCTS:   No intrahepatic or extrahepatic biliary dilatation is apparent. The common bile duct measures 4 mm. No suspicious filling defects are seen. No focal dominant stricture formation is evident.    PANCREAS: There is a circumscribed T2 hyperintense cystic lesion in the pancreatic body measuring 12 x 9 x 11 mm (series 9, image 8 and series 10, image 12), grossly unchanged from 12/02/2016 given differences in slice selection and only minimally larger   compared to the 12/05/2011 MRI.  This lesion is again in close proximity to the main pancreatic duct and may communicate with it.  There is no suspicious internal enhancement.  There is no evidence of pancreas divisum.  There is preservation of  otherwise normal T1 hyperintensity throughout the pancreas.  No solid mass or main pancreatic ductal dilatation.  No evidence of acute pancreatitis.     OTHER FINDINGS:  LUNG BASES: No significant signal abnormality is identified.    LIVER: The right hepatic lobe is elongated, unchanged and most consistent with a Riedel's lobe.  There is a bilobed T2 hyperintense cyst in the right hepatic lobe measuring 2.0 x 1.6 x 1.8 cm, previously 1.9 x 1.7 x 1.3 cm.  There are no solid liver  lesions.  There is no hepatic steatosis.  SPLEEN: No enlargement.    ADRENALS: Unremarkable, without focal mass or enlargement.    KIDNEYS: No hydronephrosis or solid masses are apparent.  There are grossly stable subcentimeter T2 hyperintense renal cortical lesions bilaterally that are too small to characterize but probably represent small cysts.  VASCULATURE: The portal vein, IVC, splenic, hepatic, renal veins, and mesenteric veins are patent.    LYMPH NODES: No lymphadenopathy is detected.    ABDOMINAL WALL: Unremarkable.    BONES:  Suboptimally assessed by scan protocol, but without grossly apparent bony lesion or fracture.       OTHER: No loculated fluid collections are appreciated.               Impression   CONCLUSION:  1. Grossly stable 1.2 cm cystic pancreatic lesion.  Differential considerations include a small pseudocyst, dilated side branch radicle or small cystic neoplasm such as a side branch intraductal papillary mucinous neoplasm (IPMN).  This lesion is grossly   unchanged dating back to December 2016.  Continued MRI/MRCP follow-up is recommended in 2 years.    2. Small bilobed right hepatic cyst has slightly increased in size.  3. Lesser incidental findings as above.           Dictated by (CST): Trevin Butt MD on 12/28/2022 at 3:17 PM      Finalized by (CST): Trevin Butt MD on 12/28/2022 at 3:33 PM           Result History    Assessment & Plan:   1. Intraductal papillary mucinous neoplasm  The patient has a history of a branch duct IPMN initially diagnosed in 2011.  This lesion has been stable without worrisome features on MR surveillance.  The patient has no localizing gastrointestinal symptoms, however, the weight loss is concerning.  We will await the results of the MRI/MRCP which has been scheduled for this afternoon.    2. Weight loss  See above.  Await MRI/MRCP results.  Weight should be monitored at least weekly with further workup if weight loss continues.  Management otherwise as per Dr. Naranjo.    3. Family history of colon cancer  The patient is up-to-date with colonoscopic screening/surveillance.  She would be due for a colonoscopy in May 2026.    4. History of colon polyps  See above.          Meds This Visit:  Requested Prescriptions      No prescriptions requested or ordered in this encounter       Imaging & Referrals:  None       [1]   Allergies  Allergen Reactions    Amoxicillin-Na Benzoate DIARRHEA     Blood in the stool

## 2025-01-20 ENCOUNTER — HOSPITAL ENCOUNTER (OUTPATIENT)
Dept: MRI IMAGING | Facility: HOSPITAL | Age: 70
Discharge: HOME | End: 2025-01-20
Attending: INTERNAL MEDICINE
Payer: MEDICARE

## 2025-01-20 ENCOUNTER — OFFICE VISIT (OUTPATIENT)
Facility: CLINIC | Age: 70
End: 2025-01-20
Payer: MEDICARE

## 2025-01-20 VITALS
HEART RATE: 89 BPM | HEIGHT: 63 IN | DIASTOLIC BLOOD PRESSURE: 74 MMHG | WEIGHT: 120 LBS | SYSTOLIC BLOOD PRESSURE: 120 MMHG | BODY MASS INDEX: 21.26 KG/M2

## 2025-01-20 DIAGNOSIS — D49.0 INTRADUCTAL PAPILLARY MUCINOUS NEOPLASM: Primary | ICD-10-CM

## 2025-01-20 DIAGNOSIS — D49.0 INTRADUCTAL PAPILLARY MUCINOUS NEOPLASM: ICD-10-CM

## 2025-01-20 DIAGNOSIS — Z86.0100 HISTORY OF COLON POLYPS: ICD-10-CM

## 2025-01-20 DIAGNOSIS — Z80.0 FAMILY HISTORY OF COLON CANCER: ICD-10-CM

## 2025-01-20 DIAGNOSIS — R63.4 WEIGHT LOSS: ICD-10-CM

## 2025-01-20 PROCEDURE — 74183 MRI ABD W/O CNTR FLWD CNTR: CPT | Performed by: INTERNAL MEDICINE

## 2025-01-20 PROCEDURE — 99203 OFFICE O/P NEW LOW 30 MIN: CPT | Performed by: INTERNAL MEDICINE

## 2025-01-20 PROCEDURE — A9575 INJ GADOTERATE MEGLUMI 0.1ML: HCPCS | Performed by: INTERNAL MEDICINE

## 2025-01-20 RX ORDER — GADOTERATE MEGLUMINE 376.9 MG/ML
15 INJECTION INTRAVENOUS
Status: COMPLETED | OUTPATIENT
Start: 2025-01-20 | End: 2025-01-20

## 2025-01-20 RX ORDER — BUPROPION HYDROCHLORIDE 150 MG/1
150 TABLET, EXTENDED RELEASE ORAL 2 TIMES DAILY
COMMUNITY
Start: 2024-08-23

## 2025-01-20 RX ADMIN — GADOTERATE MEGLUMINE 11 ML: 376.9 INJECTION INTRAVENOUS at 16:51:00

## 2025-01-20 NOTE — PATIENT INSTRUCTIONS
1.  Please monitor your weight once weekly.  2.  Continue best efforts at stopping smoking.  3.  I will let you know the results of the MRI when available.  4.  You are due for a colonoscopy in May 2026.

## 2025-01-23 ENCOUNTER — TELEPHONE (OUTPATIENT)
Facility: CLINIC | Age: 70
End: 2025-01-23

## 2025-01-23 NOTE — TELEPHONE ENCOUNTER
----- Message from Murphy Nesbittcalvin sent at 1/23/2025  5:19 PM CST -----  I spoke to Syeda.  She is feeling well.  The MRCP reveals stability of the pancreatic cystic lesion which has been stable since 2011.  We discussed that the radiologist recommended a follow-up MRCP in 1 year, however, our guidelines would suggest a 2-year interval or longer based on lesion stability over 13 years.  We have elected to proceed with a repeat MRI/MRCP in 2 years if the patient is feeling well, however, if there are any changes the MRI will be repeated sooner.  The patient also received an after visit summary that does not coincide with the after visit summary that I had dictated.  I sent her updated recommendations via [employer].    GI RNs: Please enter MRI/MRCP recall for 2 years.

## (undated) DEVICE — SNARE CAPTIFLEX MICRO-OVL OLY

## (undated) DEVICE — SNARE OPTMZ PLPCTM TRP

## (undated) DEVICE — Device: Brand: DEFENDO AIR/WATER/SUCTION AND BIOPSY VALVE

## (undated) DEVICE — Device: Brand: CUSTOM PROCEDURE KIT

## (undated) DEVICE — TRAP MCS 40ML 5IN PLS SCR CAP

## (undated) DEVICE — 35 ML SYRINGE REGULAR TIP: Brand: MONOJECT

## (undated) DEVICE — MEDI-VAC NON-CONDUCTIVE SUCTION TUBING 6MM X 1.8M (6FT.) L: Brand: CARDINAL HEALTH

## (undated) DEVICE — LINE MNTR ADLT SET O2 INTMD